# Patient Record
Sex: MALE | Race: WHITE | NOT HISPANIC OR LATINO | Employment: FULL TIME | URBAN - METROPOLITAN AREA
[De-identification: names, ages, dates, MRNs, and addresses within clinical notes are randomized per-mention and may not be internally consistent; named-entity substitution may affect disease eponyms.]

---

## 2023-05-15 ENCOUNTER — OFFICE VISIT (OUTPATIENT)
Dept: PRIMARY CARE | Facility: CLINIC | Age: 58
End: 2023-05-15
Payer: COMMERCIAL

## 2023-05-15 VITALS
BODY MASS INDEX: 36.62 KG/M2 | DIASTOLIC BLOOD PRESSURE: 86 MMHG | TEMPERATURE: 97.9 F | HEART RATE: 78 BPM | HEIGHT: 70 IN | SYSTOLIC BLOOD PRESSURE: 131 MMHG | OXYGEN SATURATION: 95 % | WEIGHT: 255.8 LBS

## 2023-05-15 DIAGNOSIS — J30.2 SEASONAL ALLERGIC RHINITIS, UNSPECIFIED TRIGGER: ICD-10-CM

## 2023-05-15 DIAGNOSIS — R19.4 BOWEL HABIT CHANGES: ICD-10-CM

## 2023-05-15 DIAGNOSIS — Z12.5 ENCOUNTER FOR PROSTATE CANCER SCREENING: ICD-10-CM

## 2023-05-15 DIAGNOSIS — Z00.00 ROUTINE HEALTH MAINTENANCE: ICD-10-CM

## 2023-05-15 DIAGNOSIS — E11.9 TYPE 2 DIABETES MELLITUS WITHOUT RETINOPATHY (MULTI): ICD-10-CM

## 2023-05-15 DIAGNOSIS — M79.604 PAIN IN BOTH LOWER EXTREMITIES: ICD-10-CM

## 2023-05-15 DIAGNOSIS — I10 PRIMARY HYPERTENSION: ICD-10-CM

## 2023-05-15 DIAGNOSIS — M79.605 PAIN IN BOTH LOWER EXTREMITIES: ICD-10-CM

## 2023-05-15 DIAGNOSIS — Z13.89 ENCOUNTER FOR SCREENING FOR OTHER DISORDER: ICD-10-CM

## 2023-05-15 DIAGNOSIS — J32.0 CHRONIC MAXILLARY SINUSITIS: ICD-10-CM

## 2023-05-15 DIAGNOSIS — M72.2 PLANTAR FASCIITIS: Primary | ICD-10-CM

## 2023-05-15 DIAGNOSIS — D35.00 ADRENAL ADENOMA, UNSPECIFIED LATERALITY: ICD-10-CM

## 2023-05-15 PROCEDURE — 4010F ACE/ARB THERAPY RXD/TAKEN: CPT | Performed by: STUDENT IN AN ORGANIZED HEALTH CARE EDUCATION/TRAINING PROGRAM

## 2023-05-15 PROCEDURE — 3079F DIAST BP 80-89 MM HG: CPT | Performed by: STUDENT IN AN ORGANIZED HEALTH CARE EDUCATION/TRAINING PROGRAM

## 2023-05-15 PROCEDURE — 1036F TOBACCO NON-USER: CPT | Performed by: STUDENT IN AN ORGANIZED HEALTH CARE EDUCATION/TRAINING PROGRAM

## 2023-05-15 PROCEDURE — 3075F SYST BP GE 130 - 139MM HG: CPT | Performed by: STUDENT IN AN ORGANIZED HEALTH CARE EDUCATION/TRAINING PROGRAM

## 2023-05-15 PROCEDURE — 99396 PREV VISIT EST AGE 40-64: CPT | Performed by: STUDENT IN AN ORGANIZED HEALTH CARE EDUCATION/TRAINING PROGRAM

## 2023-05-15 PROCEDURE — 3008F BODY MASS INDEX DOCD: CPT | Performed by: STUDENT IN AN ORGANIZED HEALTH CARE EDUCATION/TRAINING PROGRAM

## 2023-05-15 RX ORDER — LEVOCETIRIZINE DIHYDROCHLORIDE 5 MG/1
5 TABLET, FILM COATED ORAL EVERY EVENING
Qty: 90 TABLET | Refills: 1 | Status: SHIPPED | OUTPATIENT
Start: 2023-05-15 | End: 2023-11-11

## 2023-05-15 RX ORDER — CYCLOBENZAPRINE HCL 10 MG
10 TABLET ORAL EVERY 8 HOURS PRN
COMMUNITY
Start: 2017-08-08 | End: 2023-05-15 | Stop reason: SDUPTHER

## 2023-05-15 RX ORDER — LEVOCETIRIZINE DIHYDROCHLORIDE 5 MG/1
5 TABLET, FILM COATED ORAL 2 TIMES DAILY
COMMUNITY
Start: 2018-01-13 | End: 2023-05-15 | Stop reason: SDUPTHER

## 2023-05-15 RX ORDER — METFORMIN HYDROCHLORIDE 500 MG/1
500 TABLET ORAL 2 TIMES DAILY
COMMUNITY
End: 2023-07-07 | Stop reason: SDUPTHER

## 2023-05-15 RX ORDER — ALBUTEROL SULFATE 90 UG/1
2 AEROSOL, METERED RESPIRATORY (INHALATION)
COMMUNITY

## 2023-05-15 RX ORDER — LISINOPRIL 10 MG/1
10 TABLET ORAL DAILY
Qty: 90 TABLET | Refills: 1 | Status: SHIPPED | OUTPATIENT
Start: 2023-05-15 | End: 2023-11-09

## 2023-05-15 RX ORDER — DOXYCYCLINE 100 MG/1
100 CAPSULE ORAL 2 TIMES DAILY
Qty: 14 CAPSULE | Refills: 0 | Status: SHIPPED | OUTPATIENT
Start: 2023-05-15 | End: 2023-05-22

## 2023-05-15 RX ORDER — FLUTICASONE PROPIONATE 50 MCG
1 SPRAY, SUSPENSION (ML) NASAL DAILY
Qty: 16 G | Refills: 2 | Status: SHIPPED | OUTPATIENT
Start: 2023-05-15

## 2023-05-15 RX ORDER — LISINOPRIL 10 MG/1
10 TABLET ORAL DAILY
COMMUNITY
Start: 2023-02-07 | End: 2023-05-15 | Stop reason: SDUPTHER

## 2023-05-15 RX ORDER — CYCLOBENZAPRINE HCL 10 MG
10 TABLET ORAL EVERY 8 HOURS PRN
Qty: 30 TABLET | Refills: 0 | Status: SHIPPED | OUTPATIENT
Start: 2023-05-15 | End: 2023-09-20 | Stop reason: SDUPTHER

## 2023-05-15 RX ORDER — FAMOTIDINE 20 MG/1
20 TABLET, FILM COATED ORAL 2 TIMES DAILY
COMMUNITY

## 2023-05-15 RX ORDER — FLUTICASONE PROPIONATE 50 MCG
1 SPRAY, SUSPENSION (ML) NASAL
COMMUNITY
End: 2023-05-15 | Stop reason: SDUPTHER

## 2023-05-15 ASSESSMENT — ENCOUNTER SYMPTOMS
SHORTNESS OF BREATH: 0
FEVER: 0
CHILLS: 0
DYSURIA: 0
SINUS PRESSURE: 1
VOMITING: 0
PALPITATIONS: 0
EYE PAIN: 1
DIAPHORESIS: 0
NAUSEA: 0

## 2023-05-15 ASSESSMENT — PATIENT HEALTH QUESTIONNAIRE - PHQ9
SUM OF ALL RESPONSES TO PHQ9 QUESTIONS 1 AND 2: 6
1. LITTLE INTEREST OR PLEASURE IN DOING THINGS: NEARLY EVERY DAY
2. FEELING DOWN, DEPRESSED OR HOPELESS: NEARLY EVERY DAY

## 2023-05-15 NOTE — PROGRESS NOTES
"Subjective   Patient ID: Oz Epstein is a 57 y.o. male who presents for Annual Exam.  He is here for CPE.    Weight loss but unchanged from Feb.    Plantar fasciitis: Dx with plantar fasciitis and last saw a few years ago. Feels has worsen despite conservative measures and stretches.     He gets numbness/tingling in hands and feet at times. Constantly moving around at night and having trouble sleeping. Urinating more frequently.    Dr. Weston is his urologist. Seeing for chronic Epididymitis.    He reports penciling in the stools. Sometimes has diarrhea and sometimes has constipation. Has blood in stool and hemorrhoids and improves with time. Noticing more whitish orange specks and mucousy changes in his stool. Does have pain with bowel movements.     Feels his ears get warm and temp of 102 in his ears when temporal temp at home is normal. He still feels lots of fluid in his ears and his sinuses. Feels a clicking in his ears. Less hearing in his left year when he lays down but improves when he sits up. Feels sinusitis hasn't really improved and has bilateral maxillary sinusitis.    Has had some depressed mood as yesterday was Mother's Day which is exceptionally hard given the loss of his daughter and his mother.  Feels he is coping appropriately and has support.    A1c: 6.2 in 2016. Saw eye doctor a year ago.         Review of Systems   Constitutional:  Negative for chills, diaphoresis and fever.   HENT:  Positive for hearing loss and sinus pressure (Maxillary bilaterally).    Eyes:  Positive for pain (bilateral L>R, saw eye doctor for this). Negative for visual disturbance.   Respiratory:  Negative for shortness of breath.    Cardiovascular:  Negative for chest pain and palpitations.   Gastrointestinal:  Negative for nausea and vomiting.   Genitourinary:  Negative for dysuria.   Skin:  Negative for rash.       /86   Pulse 78   Temp 36.6 °C (97.9 °F)   Ht 1.778 m (5' 10\")   Wt 116 kg (255 lb 12.8 oz)   " SpO2 95%   BMI 36.70 kg/m²     Objective   Physical Exam  Vitals reviewed.   Constitutional:       General: He is not in acute distress.     Appearance: Normal appearance.   HENT:      Head: Normocephalic.   Cardiovascular:      Rate and Rhythm: Normal rate and regular rhythm.   Pulmonary:      Effort: Pulmonary effort is normal. No respiratory distress.      Breath sounds: Normal breath sounds.   Abdominal:      General: There is no distension.   Musculoskeletal:         General: No deformity.        Feet:    Feet:      Comments: Areas in yellow with intact monofilament testing.  Skin:     Coloration: Skin is not jaundiced.   Neurological:      Mental Status: He is alert.         Assessment/Plan   Problem List Items Addressed This Visit          Circulatory    Primary hypertension     Controlled today, continue lisinopril 10 mg a day.  Will get EKG at next visit next month.         Relevant Medications    lisinopril 10 mg tablet       Digestive    Adrenal adenoma     Bilateral adrenal nodules on past imaging.  Had previously been seeing endocrinology.  Reordered ultrasound of the adrenal glands and may need to re establish care.  Will discuss next month.         Relevant Orders    US renal complete       Musculoskeletal    Pain in both lower extremities     Flexeril 10 mg every 8 hours only as needed.         Relevant Medications    cyclobenzaprine (Flexeril) 10 mg tablet    Plantar fasciitis - Primary     Given lack improvement with conservative measures referred to podiatry to establish care.         Relevant Orders    Referral to Podiatry       Endocrine/Metabolic    Type 2 diabetes mellitus without retinopathy (CMS/HCC)     Repeat A1c ordered.  Currently on metformin 500 mg twice a day and may need to increase depending on what A1c is. Diabetic foot exam normal today. Ordered urine spot protein. Recommended annual eye exam.          Relevant Orders    Protein, Urine Random    BMI 36.0-36.9,adult        Infectious/Inflammatory    Chronic maxillary sinusitis     Given the persistence of symptoms ordered CT of the sinuses and referred to ENT.  Empirically treating with a course of Doxycycline.  Advised to drink a full glass of water and sit upright for at least an hour after taking to reduce risk of ulcers.         Relevant Medications    doxycycline (Vibramycin) 100 mg capsule    Other Relevant Orders    CT sinus wo IV contrast    Referral to ENT       Other    Encounter for prostate cancer screening     PSA ordered.         Relevant Orders    Prostate Spec.Ag,Screen    Routine health maintenance     Health Maintenance  -Prostate Cancer Screening: Ordered  -Vaccinations: Will recommend Tdap, COVID vaccination and booster, shingles vaccine at next visit.   -Lung Cancer Screening: We will quantify cigarette use but quit in 2010  -AAA Screening: Age 63  -Colonoscopy: Up-to-date  -Screen for HIV/Hep C: Discuss at next visit  -Explore diet further at next visit.         Relevant Orders    Comprehensive Metabolic Panel    Lipid Panel    CBC    TSH with reflex to Free T4 if abnormal    Hemoglobin A1c    Bowel habit changes     Normal colonoscopy in 2022 and to be repeated in 3 years.  Referred to GI given changes with his bowel movements.         Relevant Orders    Referral to Gastroenterology    Seasonal allergic rhinitis     Continue levocetirizine and Flonase.         Relevant Medications    levocetirizine (Xyzal) 5 mg tablet    fluticasone (Flonase) 50 mcg/actuation nasal spray     Other Visit Diagnoses       Encounter for screening for other disorder [Z13.89 (ICD-10-CM)]

## 2023-05-16 NOTE — ASSESSMENT & PLAN NOTE
Health Maintenance  -Prostate Cancer Screening: Ordered  -Vaccinations: Will recommend Tdap, COVID vaccination and booster, shingles vaccine at next visit.   -Lung Cancer Screening: We will quantify cigarette use but quit in 2010  -AAA Screening: Age 63  -Colonoscopy: Up-to-date  -Screen for HIV/Hep C: Discuss at next visit  -Explore diet further at next visit.

## 2023-05-16 NOTE — ASSESSMENT & PLAN NOTE
Bilateral adrenal nodules on past imaging.  Had previously been seeing endocrinology.  Reordered ultrasound of the adrenal glands and may need to re establish care.  Will discuss next month.

## 2023-05-16 NOTE — ASSESSMENT & PLAN NOTE
Normal colonoscopy in 2022 and to be repeated in 3 years.  Referred to GI given changes with his bowel movements.

## 2023-05-16 NOTE — ASSESSMENT & PLAN NOTE
Repeat A1c ordered.  Currently on metformin 500 mg twice a day and may need to increase depending on what A1c is. Diabetic foot exam normal today. Ordered urine spot protein. Recommended annual eye exam.

## 2023-05-16 NOTE — ASSESSMENT & PLAN NOTE
Given the persistence of symptoms ordered CT of the sinuses and referred to ENT.  Empirically treating with a course of Doxycycline.  Advised to drink a full glass of water and sit upright for at least an hour after taking to reduce risk of ulcers.

## 2023-05-19 ENCOUNTER — APPOINTMENT (OUTPATIENT)
Dept: PRIMARY CARE | Facility: CLINIC | Age: 58
End: 2023-05-19
Payer: MEDICARE

## 2023-06-01 DIAGNOSIS — J32.0 CHRONIC MAXILLARY SINUSITIS: Primary | ICD-10-CM

## 2023-06-15 ENCOUNTER — APPOINTMENT (OUTPATIENT)
Dept: PRIMARY CARE | Facility: CLINIC | Age: 58
End: 2023-06-15
Payer: MEDICARE

## 2023-06-20 ENCOUNTER — APPOINTMENT (OUTPATIENT)
Dept: PRIMARY CARE | Facility: CLINIC | Age: 58
End: 2023-06-20
Payer: MEDICARE

## 2023-07-07 DIAGNOSIS — E11.9 TYPE 2 DIABETES MELLITUS WITHOUT RETINOPATHY (MULTI): Primary | ICD-10-CM

## 2023-07-07 RX ORDER — METFORMIN HYDROCHLORIDE 500 MG/1
500 TABLET ORAL 2 TIMES DAILY
Qty: 180 TABLET | Refills: 0 | Status: SHIPPED | OUTPATIENT
Start: 2023-07-07 | End: 2023-11-09

## 2023-07-18 ENCOUNTER — TELEPHONE (OUTPATIENT)
Dept: PRIMARY CARE | Facility: CLINIC | Age: 58
End: 2023-07-18
Payer: MEDICARE

## 2023-07-18 PROBLEM — N62 GYNECOMASTIA: Status: ACTIVE | Noted: 2023-07-18

## 2023-07-18 PROBLEM — E55.9 VITAMIN D DEFICIENCY: Status: ACTIVE | Noted: 2023-07-18

## 2023-07-18 NOTE — TELEPHONE ENCOUNTER
----- Message from Arturo Stark DO sent at 7/18/2023  7:32 AM EDT -----  Please let him know I'd like to see him for follow up within the next month and to get the lab work we discussed.

## 2023-07-31 ENCOUNTER — APPOINTMENT (OUTPATIENT)
Dept: PRIMARY CARE | Facility: CLINIC | Age: 58
End: 2023-07-31
Payer: MEDICARE

## 2023-09-15 ENCOUNTER — TELEPHONE (OUTPATIENT)
Dept: PRIMARY CARE | Facility: CLINIC | Age: 58
End: 2023-09-15
Payer: MEDICARE

## 2023-09-20 DIAGNOSIS — M79.604 PAIN IN BOTH LOWER EXTREMITIES: ICD-10-CM

## 2023-09-20 DIAGNOSIS — M79.605 PAIN IN BOTH LOWER EXTREMITIES: ICD-10-CM

## 2023-09-21 DIAGNOSIS — M79.604 PAIN IN BOTH LOWER EXTREMITIES: ICD-10-CM

## 2023-09-21 DIAGNOSIS — M79.605 PAIN IN BOTH LOWER EXTREMITIES: ICD-10-CM

## 2023-09-21 RX ORDER — CYCLOBENZAPRINE HCL 10 MG
10 TABLET ORAL EVERY 8 HOURS PRN
Qty: 30 TABLET | Refills: 0 | Status: SHIPPED | OUTPATIENT
Start: 2023-09-21 | End: 2023-09-21

## 2023-09-21 RX ORDER — CYCLOBENZAPRINE HCL 10 MG
10 TABLET ORAL EVERY 8 HOURS PRN
Qty: 30 TABLET | Refills: 0 | Status: SHIPPED | OUTPATIENT
Start: 2023-09-21 | End: 2023-10-21

## 2023-10-05 ENCOUNTER — APPOINTMENT (OUTPATIENT)
Dept: PRIMARY CARE | Facility: CLINIC | Age: 58
End: 2023-10-05
Payer: MEDICARE

## 2023-10-10 ENCOUNTER — LAB (OUTPATIENT)
Dept: LAB | Facility: LAB | Age: 58
End: 2023-10-10
Payer: MEDICARE

## 2023-10-10 DIAGNOSIS — Z12.5 ENCOUNTER FOR PROSTATE CANCER SCREENING: ICD-10-CM

## 2023-10-10 DIAGNOSIS — Z00.00 ROUTINE HEALTH MAINTENANCE: ICD-10-CM

## 2023-10-10 LAB
ALBUMIN SERPL BCP-MCNC: 4 G/DL (ref 3.4–5)
ALP SERPL-CCNC: 61 U/L (ref 33–120)
ALT SERPL W P-5'-P-CCNC: 11 U/L (ref 10–52)
ANION GAP SERPL CALC-SCNC: 10 MMOL/L (ref 10–20)
AST SERPL W P-5'-P-CCNC: 11 U/L (ref 9–39)
BILIRUB SERPL-MCNC: 0.4 MG/DL (ref 0–1.2)
BUN SERPL-MCNC: 8 MG/DL (ref 6–23)
CALCIUM SERPL-MCNC: 9 MG/DL (ref 8.6–10.3)
CHLORIDE SERPL-SCNC: 102 MMOL/L (ref 98–107)
CHOLEST SERPL-MCNC: 155 MG/DL (ref 0–199)
CHOLESTEROL/HDL RATIO: 4
CO2 SERPL-SCNC: 31 MMOL/L (ref 21–32)
CREAT SERPL-MCNC: 0.93 MG/DL (ref 0.5–1.3)
ERYTHROCYTE [DISTWIDTH] IN BLOOD BY AUTOMATED COUNT: 14.2 % (ref 11.5–14.5)
EST. AVERAGE GLUCOSE BLD GHB EST-MCNC: 120 MG/DL
GFR SERPL CREATININE-BSD FRML MDRD: >90 ML/MIN/1.73M*2
GLUCOSE SERPL-MCNC: 99 MG/DL (ref 74–99)
HBA1C MFR BLD: 5.8 %
HCT VFR BLD AUTO: 44.2 % (ref 41–52)
HDLC SERPL-MCNC: 38.6 MG/DL
HGB BLD-MCNC: 14.4 G/DL (ref 13.5–17.5)
LDLC SERPL CALC-MCNC: 94 MG/DL (ref 140–190)
MCH RBC QN AUTO: 29.5 PG (ref 26–34)
MCHC RBC AUTO-ENTMCNC: 32.6 G/DL (ref 32–36)
MCV RBC AUTO: 91 FL (ref 80–100)
NON HDL CHOLESTEROL: 116 MG/DL (ref 0–149)
NRBC BLD-RTO: 0 /100 WBCS (ref 0–0)
PLATELET # BLD AUTO: 295 X10*3/UL (ref 150–450)
PMV BLD AUTO: 10.6 FL (ref 7.5–11.5)
POTASSIUM SERPL-SCNC: 4 MMOL/L (ref 3.5–5.3)
PROT SERPL-MCNC: 6.5 G/DL (ref 6.4–8.2)
PSA SERPL-MCNC: 0.91 NG/ML
RBC # BLD AUTO: 4.88 X10*6/UL (ref 4.5–5.9)
SODIUM SERPL-SCNC: 139 MMOL/L (ref 136–145)
TRIGL SERPL-MCNC: 112 MG/DL (ref 0–149)
TSH SERPL-ACNC: 1.7 MIU/L (ref 0.44–3.98)
VLDL: 22 MG/DL (ref 0–40)
WBC # BLD AUTO: 10 X10*3/UL (ref 4.4–11.3)

## 2023-10-10 PROCEDURE — 80053 COMPREHEN METABOLIC PANEL: CPT

## 2023-10-10 PROCEDURE — 85027 COMPLETE CBC AUTOMATED: CPT

## 2023-10-10 PROCEDURE — 36415 COLL VENOUS BLD VENIPUNCTURE: CPT

## 2023-10-10 PROCEDURE — 84443 ASSAY THYROID STIM HORMONE: CPT

## 2023-10-10 PROCEDURE — 83036 HEMOGLOBIN GLYCOSYLATED A1C: CPT

## 2023-10-10 PROCEDURE — 84153 ASSAY OF PSA TOTAL: CPT

## 2023-10-10 PROCEDURE — 80061 LIPID PANEL: CPT

## 2023-10-11 ENCOUNTER — APPOINTMENT (OUTPATIENT)
Dept: PRIMARY CARE | Facility: CLINIC | Age: 58
End: 2023-10-11
Payer: MEDICARE

## 2023-10-17 ENCOUNTER — APPOINTMENT (OUTPATIENT)
Dept: PRIMARY CARE | Facility: CLINIC | Age: 58
End: 2023-10-17
Payer: MEDICARE

## 2023-11-09 DIAGNOSIS — E11.9 TYPE 2 DIABETES MELLITUS WITHOUT RETINOPATHY (MULTI): ICD-10-CM

## 2023-11-09 DIAGNOSIS — I10 PRIMARY HYPERTENSION: ICD-10-CM

## 2023-11-09 RX ORDER — METFORMIN HYDROCHLORIDE 500 MG/1
500 TABLET ORAL 2 TIMES DAILY
Qty: 180 TABLET | Refills: 0 | Status: SHIPPED | OUTPATIENT
Start: 2023-11-09 | End: 2024-02-28 | Stop reason: SDUPTHER

## 2023-11-09 RX ORDER — LISINOPRIL 10 MG/1
10 TABLET ORAL DAILY
Qty: 90 TABLET | Refills: 3 | Status: SHIPPED | OUTPATIENT
Start: 2023-11-09 | End: 2024-11-03

## 2023-11-10 ENCOUNTER — TELEMEDICINE (OUTPATIENT)
Dept: PRIMARY CARE | Facility: CLINIC | Age: 58
End: 2023-11-10
Payer: MEDICARE

## 2023-11-10 DIAGNOSIS — Z87.891 FORMER SMOKER: ICD-10-CM

## 2023-11-10 DIAGNOSIS — N41.0 ACUTE PROSTATITIS: Primary | ICD-10-CM

## 2023-11-10 DIAGNOSIS — Z86.59 HISTORY OF DEPRESSIVE SYMPTOMS: ICD-10-CM

## 2023-11-10 DIAGNOSIS — Z12.2 ENCOUNTER FOR SCREENING FOR LUNG CANCER: ICD-10-CM

## 2023-11-10 PROCEDURE — 99214 OFFICE O/P EST MOD 30 MIN: CPT | Performed by: STUDENT IN AN ORGANIZED HEALTH CARE EDUCATION/TRAINING PROGRAM

## 2023-11-10 RX ORDER — SULFAMETHOXAZOLE AND TRIMETHOPRIM 800; 160 MG/1; MG/1
1 TABLET ORAL 2 TIMES DAILY
Qty: 14 TABLET | Refills: 0 | Status: SHIPPED | OUTPATIENT
Start: 2023-11-10 | End: 2023-11-17

## 2023-11-10 ASSESSMENT — ENCOUNTER SYMPTOMS
DYSURIA: 1
VOMITING: 0
DIFFICULTY URINATING: 1
DIAPHORESIS: 1
HEMATURIA: 0
DIARRHEA: 1
CHILLS: 1
FEVER: 0
DIZZINESS: 0
CONSTIPATION: 1
SHORTNESS OF BREATH: 1
LIGHT-HEADEDNESS: 0
NAUSEA: 0

## 2023-11-10 NOTE — PROGRESS NOTES
Subjective   Patient ID: Oz Epstein is a 58 y.o. male who presents for prostatitis.    We attempted to have a virtual visit via audio and video. We experienced technical difficulties with the video portion of the visit but did complete the virtual visit by telephone.     He is having concerns of prostatitis. Has been having burning in the penis for the last 3 weeks with mild pain. He states that he is now having lower back pain. Afebrile at home. He is having burning in the penis and prostatitis symptoms, smaller caliper stools. Hasn't seen Dr. Weston.     Working 60 hours a week. Feeling anxiety and depression since quite stressful at work.     Saw Dr. Man with GI, will follow up.          Review of Systems   Constitutional:  Positive for chills (intermittent) and diaphoresis (at night). Negative for fever.   Eyes:  Negative for visual disturbance.   Respiratory:  Positive for shortness of breath (mild chronic unchanged for 5-6 years).    Cardiovascular:  Negative for chest pain.   Gastrointestinal:  Positive for constipation (alternates between diarrhea) and diarrhea (alterantes between constipation). Negative for nausea and vomiting.   Genitourinary:  Positive for difficulty urinating and dysuria. Negative for hematuria.   Neurological:  Negative for dizziness and light-headedness.       Objective   There were no vitals taken for this visit.    Physical Exam    Assessment/Plan   Problem List Items Addressed This Visit       History of depressive symptoms    Encounter for screening for lung cancer    Relevant Orders    CT lung screening low dose    Acute prostatitis - Primary    Relevant Medications    sulfamethoxazole-trimethoprim (Bactrim DS) 800-160 mg tablet    Other Relevant Orders    Urinalysis with Reflex Microscopic    Urine Culture    Former smoker    Relevant Orders    CT lung screening low dose   Prostatitis  -Will send 1 week of Bactrim to patient's pharmacy.  Advised monitor for any palpitations  or chest pain, shortness of breath and go to ER if these develop.  Discussed Bactrim versus Cipro and decided on Bactrim after discussing with pharmacy downstairs after visit given QT prolongation with Cipro as well as possible tendinopathy versus hyperkalemia with Bactrim though is on low-dose of lisinopril.  - ER for fever, worsening chills, sweats or reasons other was mentioned above.  -Advised that he needs to call urology to discuss symptoms as well as further management.  - Follow-up with me next week.  - Urinalysis and urine culture ordered and advised to have this done at  lab prior to starting antibiotics    History of tobacco use  - Given long-term tobacco history dissolvable for lung CT, ordered today.    Depressive symptoms  -Patient discussed that he would like to take FMLA and he stated that he would need a recent medically to doing so and he states that he has been having significant trouble with mood and depression.  He will follow-up with me in person next week to discuss this further as well as review his other medical concerns.    Healthcare maintenance  -We will need to do EKG in office at next visit next week as well as review above concerns.  Office will call him to schedule this.

## 2023-11-12 PROBLEM — Z87.891 FORMER SMOKER: Status: ACTIVE | Noted: 2023-11-12

## 2023-11-12 PROBLEM — Z12.2 ENCOUNTER FOR SCREENING FOR LUNG CANCER: Status: ACTIVE | Noted: 2023-11-12

## 2023-11-12 PROBLEM — N41.0 ACUTE PROSTATITIS: Status: ACTIVE | Noted: 2023-11-12

## 2023-11-12 PROBLEM — Z86.59 HISTORY OF DEPRESSIVE SYMPTOMS: Status: ACTIVE | Noted: 2023-11-12

## 2023-11-12 PROBLEM — F17.200 TOBACCO DEPENDENCE: Status: ACTIVE | Noted: 2023-11-12

## 2023-11-20 ENCOUNTER — OFFICE VISIT (OUTPATIENT)
Dept: PRIMARY CARE | Facility: CLINIC | Age: 58
End: 2023-11-20
Payer: MEDICARE

## 2023-11-20 VITALS
DIASTOLIC BLOOD PRESSURE: 70 MMHG | BODY MASS INDEX: 36.36 KG/M2 | OXYGEN SATURATION: 98 % | WEIGHT: 254 LBS | RESPIRATION RATE: 16 BRPM | SYSTOLIC BLOOD PRESSURE: 132 MMHG | HEART RATE: 85 BPM | TEMPERATURE: 98.1 F | HEIGHT: 70 IN

## 2023-11-20 DIAGNOSIS — R94.31 EKG ABNORMALITY: ICD-10-CM

## 2023-11-20 DIAGNOSIS — I10 PRIMARY HYPERTENSION: ICD-10-CM

## 2023-11-20 DIAGNOSIS — Z86.59 HISTORY OF DEPRESSIVE SYMPTOMS: ICD-10-CM

## 2023-11-20 DIAGNOSIS — D35.00 ADRENAL ADENOMA, UNSPECIFIED LATERALITY: ICD-10-CM

## 2023-11-20 DIAGNOSIS — F41.9 ANXIETY: ICD-10-CM

## 2023-11-20 DIAGNOSIS — N41.0 ACUTE PROSTATITIS: Primary | ICD-10-CM

## 2023-11-20 DIAGNOSIS — J30.2 SEASONAL ALLERGIC RHINITIS, UNSPECIFIED TRIGGER: ICD-10-CM

## 2023-11-20 PROCEDURE — 3044F HG A1C LEVEL LT 7.0%: CPT | Performed by: STUDENT IN AN ORGANIZED HEALTH CARE EDUCATION/TRAINING PROGRAM

## 2023-11-20 PROCEDURE — 3078F DIAST BP <80 MM HG: CPT | Performed by: STUDENT IN AN ORGANIZED HEALTH CARE EDUCATION/TRAINING PROGRAM

## 2023-11-20 PROCEDURE — 3008F BODY MASS INDEX DOCD: CPT | Performed by: STUDENT IN AN ORGANIZED HEALTH CARE EDUCATION/TRAINING PROGRAM

## 2023-11-20 PROCEDURE — 3075F SYST BP GE 130 - 139MM HG: CPT | Performed by: STUDENT IN AN ORGANIZED HEALTH CARE EDUCATION/TRAINING PROGRAM

## 2023-11-20 PROCEDURE — 93000 ELECTROCARDIOGRAM COMPLETE: CPT | Performed by: STUDENT IN AN ORGANIZED HEALTH CARE EDUCATION/TRAINING PROGRAM

## 2023-11-20 PROCEDURE — 4010F ACE/ARB THERAPY RXD/TAKEN: CPT | Performed by: STUDENT IN AN ORGANIZED HEALTH CARE EDUCATION/TRAINING PROGRAM

## 2023-11-20 PROCEDURE — 3048F LDL-C <100 MG/DL: CPT | Performed by: STUDENT IN AN ORGANIZED HEALTH CARE EDUCATION/TRAINING PROGRAM

## 2023-11-20 PROCEDURE — 99214 OFFICE O/P EST MOD 30 MIN: CPT | Performed by: STUDENT IN AN ORGANIZED HEALTH CARE EDUCATION/TRAINING PROGRAM

## 2023-11-20 PROCEDURE — 1036F TOBACCO NON-USER: CPT | Performed by: STUDENT IN AN ORGANIZED HEALTH CARE EDUCATION/TRAINING PROGRAM

## 2023-11-20 ASSESSMENT — PATIENT HEALTH QUESTIONNAIRE - PHQ9
7. TROUBLE CONCENTRATING ON THINGS, SUCH AS READING THE NEWSPAPER OR WATCHING TELEVISION: NOT AT ALL
4. FEELING TIRED OR HAVING LITTLE ENERGY: NEARLY EVERY DAY
8. MOVING OR SPEAKING SO SLOWLY THAT OTHER PEOPLE COULD HAVE NOTICED. OR THE OPPOSITE, BEING SO FIGETY OR RESTLESS THAT YOU HAVE BEEN MOVING AROUND A LOT MORE THAN USUAL: SEVERAL DAYS
6. FEELING BAD ABOUT YOURSELF - OR THAT YOU ARE A FAILURE OR HAVE LET YOURSELF OR YOUR FAMILY DOWN: NOT AT ALL
5. POOR APPETITE OR OVEREATING: MORE THAN HALF THE DAYS
2. FEELING DOWN, DEPRESSED OR HOPELESS: SEVERAL DAYS
9. THOUGHTS THAT YOU WOULD BE BETTER OFF DEAD, OR OF HURTING YOURSELF: NOT AT ALL
3. TROUBLE FALLING OR STAYING ASLEEP: NEARLY EVERY DAY
1. LITTLE INTEREST OR PLEASURE IN DOING THINGS: SEVERAL DAYS
SUM OF ALL RESPONSES TO PHQ9 QUESTIONS 1 & 2: 2
SUM OF ALL RESPONSES TO PHQ QUESTIONS 1-9: 11

## 2023-11-20 ASSESSMENT — ENCOUNTER SYMPTOMS
FLANK PAIN: 0
COUGH: 0
DYSURIA: 0
VOMITING: 0
DIAPHORESIS: 1
DIARRHEA: 0
LIGHT-HEADEDNESS: 0
UNEXPECTED WEIGHT CHANGE: 0
CHILLS: 1
DIFFICULTY URINATING: 1
NAUSEA: 0
FEVER: 0
SHORTNESS OF BREATH: 1
HEMATURIA: 0

## 2023-11-20 ASSESSMENT — ANXIETY QUESTIONNAIRES
1. FEELING NERVOUS, ANXIOUS, OR ON EDGE: SEVERAL DAYS
3. WORRYING TOO MUCH ABOUT DIFFERENT THINGS: NEARLY EVERY DAY
5. BEING SO RESTLESS THAT IT IS HARD TO SIT STILL: SEVERAL DAYS
7. FEELING AFRAID AS IF SOMETHING AWFUL MIGHT HAPPEN: NOT AT ALL
6. BECOMING EASILY ANNOYED OR IRRITABLE: MORE THAN HALF THE DAYS
GAD7 TOTAL SCORE: 8
4. TROUBLE RELAXING: SEVERAL DAYS
2. NOT BEING ABLE TO STOP OR CONTROL WORRYING: NOT AT ALL

## 2023-11-20 NOTE — PROGRESS NOTES
"Subjective   Patient ID: Oz Epstein is a 58 y.o. male who presents for Follow-up.    Pt is here today for a routine follow up.     Pt is also following up on prostatitis. Pt states that he is still having sx. He did finish a script of bactrim, having minimal symptoms. Seeing Dr. Weston and Dr. Man next week.     Exhausted for a few weeks with fatigue with recent UTI/prostatitis symptoms.     Able to walk 2 flights of stairs without SOB or chest pain.     Reports RLQ pain has been present most of his life and is unchanged.             Review of Systems   Constitutional:  Positive for chills (intermittent and rare) and diaphoresis (rare nights weats). Negative for fever and unexpected weight change.   HENT:  Negative for hearing loss.    Eyes:  Positive for visual disturbance (glasses aren't as effective on right-sees eye doctor).   Respiratory:  Positive for shortness of breath. Negative for cough.    Cardiovascular:  Negative for chest pain.   Gastrointestinal:  Negative for diarrhea, nausea and vomiting.   Genitourinary:  Positive for difficulty urinating (Stream not strong as used to be) and urgency. Negative for dysuria, flank pain, hematuria and penile discharge.        Suprapubic discomfort, intermittent and not related to urination.     Foul smelling urine resolved   Skin:  Negative for rash.   Neurological:  Negative for light-headedness.       Objective   /70   Pulse 85   Temp 36.7 °C (98.1 °F) (Tympanic)   Resp 16   Ht 1.778 m (5' 10\")   Wt 115 kg (254 lb)   SpO2 98%   BMI 36.45 kg/m²     Physical Exam  Vitals reviewed.   Constitutional:       General: He is not in acute distress.     Appearance: Normal appearance.   HENT:      Head: Normocephalic.   Cardiovascular:      Rate and Rhythm: Normal rate and regular rhythm.   Pulmonary:      Effort: Pulmonary effort is normal. No respiratory distress.      Breath sounds: Normal breath sounds.   Abdominal:      General: There is no distension. "      Palpations: There is no mass.      Tenderness: There is abdominal tenderness (mild suprapubic and rlq tenderness). There is no right CVA tenderness, left CVA tenderness, guarding or rebound.   Musculoskeletal:         General: No deformity.   Skin:     Coloration: Skin is not jaundiced.   Neurological:      Mental Status: He is alert.         Assessment/Plan   Problem List Items Addressed This Visit       Adrenal adenoma    Primary hypertension    Relevant Orders    ECG 12 lead (Clinic Performed) (Completed)    Seasonal allergic rhinitis    History of depressive symptoms    Acute prostatitis - Primary    Relevant Orders    Urinalysis with Reflex Microscopic    Urine Culture    EKG abnormality    Relevant Orders    Transthoracic Echo (TTE) Complete    Anxiety   Possible Prostatitis  -He will get UA and urine culture today. He did not get previous urinalysis and urine culture.   -Bactrim pending results of UA and urine culture  - ER for fever, worsening chills, sweats or reasons other was mentioned above.  -Advised that he needs to call urology to discuss symptoms as well as further management as is out of my scope for long term management of this.     History of tobacco use  - Given long-term tobacco history he is eligible for lung CT, he will schedule     Depression  Anxiety  -PHQ9 is 11, GAD7 is 8.  -He is quite overwhelmed with stress of work, financial situation after death of family members, and the stress of current medical issues. It is affecting him at night as well. I think it is reasonable given all of this to take a week off of work and reset.   -Not interested in medication right now but will reassess in 2-4 weeks. Could be related to acute stress.   EKG abnormality  -Seen today on ekg  -?Anterior infarct, echo ordered    Seasonal allergic rhinitis  -Continue levocetirizine and Flonase.     Type 2 diabetes mellitus  -Repeat A1c 3 months.  Currently on metformin 500 mg twice a day and may need to  increase depending on what A1c is. Diabetic foot exam normal at prior visit. Ordered urine spot protein. Recommended annual eye exam.     Plantar fasciitis  -Given lack improvement with conservative measures referred to podiatry to establish care.  -Will see today    Adrenal adenoma  -Was seeing endocrinology  -Renal u/s showed no adenomas  -Can refer to endo in future vs ct adrenal    Primary hypertension  -On lisinopril 10mg/day  -Controlled relatively well today, readdress after urinary tract issues    Health Maintenance  -Prostate Cancer Screening: Normal 10/23  -Vaccinations: Recommend Tdap, COVID vaccination and booster, shingles vaccine.  -Lung Cancer Screening: Ordered  -AAA Screening: Age 65  -Colonoscopy: Up-to-date-done 6/22 at [facility], due 6/25  -Screen for HIV/Hep C: Discuss at next visit    F/u 2-4 weeks

## 2023-11-20 NOTE — LETTER
November 20, 2023     Patient: Oz Epstein   YOB: 1965   Date of Visit: 11/20/2023       To Whom It May Concern:    Oz Epstein was seen in my clinic on 11/20/2023 at 8:00 am. Please excuse Oz for his absence from work and he is to return 11/27/2023.    If you have any questions or concerns, please don't hesitate to call.         Sincerely,         Arturo Stark,         CC: No Recipients

## 2024-01-08 ENCOUNTER — HOSPITAL ENCOUNTER (OUTPATIENT)
Dept: CARDIOLOGY | Facility: CLINIC | Age: 59
Discharge: HOME | End: 2024-01-08
Payer: MEDICARE

## 2024-01-08 DIAGNOSIS — R94.31 EKG ABNORMALITY: ICD-10-CM

## 2024-01-08 PROCEDURE — 93306 TTE W/DOPPLER COMPLETE: CPT

## 2024-01-08 PROCEDURE — 93306 TTE W/DOPPLER COMPLETE: CPT | Performed by: STUDENT IN AN ORGANIZED HEALTH CARE EDUCATION/TRAINING PROGRAM

## 2024-01-09 LAB
AORTIC VALVE PEAK VELOCITY: 1.34
AV PEAK GRADIENT: 7.1
AVA (PEAK VEL): 4.48
EJECTION FRACTION APICAL 4 CHAMBER: 63.5
EJECTION FRACTION: 61
LEFT ATRIUM VOLUME AREA LENGTH INDEX BSA: 36.2
LEFT VENTRICLE INTERNAL DIMENSION DIASTOLE: 4.78 (ref 3.5–6)
LEFT VENTRICULAR OUTFLOW TRACT DIAMETER: 2.49
MITRAL VALVE E/A RATIO: 0.75
MITRAL VALVE E/E' RATIO: 13.83
RIGHT VENTRICLE FREE WALL PEAK S': 12
TRICUSPID ANNULAR PLANE SYSTOLIC EXCURSION: 2.1

## 2024-02-28 DIAGNOSIS — E11.9 TYPE 2 DIABETES MELLITUS WITHOUT RETINOPATHY (MULTI): ICD-10-CM

## 2024-02-28 RX ORDER — METFORMIN HYDROCHLORIDE 500 MG/1
500 TABLET ORAL 2 TIMES DAILY
Qty: 180 TABLET | Refills: 0 | Status: SHIPPED | OUTPATIENT
Start: 2024-02-28

## 2024-03-01 ENCOUNTER — TELEMEDICINE (OUTPATIENT)
Dept: PRIMARY CARE | Facility: CLINIC | Age: 59
End: 2024-03-01
Payer: MEDICARE

## 2024-03-01 DIAGNOSIS — J20.9 ACUTE BRONCHITIS, UNSPECIFIED ORGANISM: Primary | ICD-10-CM

## 2024-03-01 PROCEDURE — 1036F TOBACCO NON-USER: CPT | Performed by: STUDENT IN AN ORGANIZED HEALTH CARE EDUCATION/TRAINING PROGRAM

## 2024-03-01 PROCEDURE — 99443 PR PHYS/QHP TELEPHONE EVALUATION 21-30 MIN: CPT | Performed by: STUDENT IN AN ORGANIZED HEALTH CARE EDUCATION/TRAINING PROGRAM

## 2024-03-01 PROCEDURE — 3008F BODY MASS INDEX DOCD: CPT | Performed by: STUDENT IN AN ORGANIZED HEALTH CARE EDUCATION/TRAINING PROGRAM

## 2024-03-01 PROCEDURE — 4010F ACE/ARB THERAPY RXD/TAKEN: CPT | Performed by: STUDENT IN AN ORGANIZED HEALTH CARE EDUCATION/TRAINING PROGRAM

## 2024-03-01 RX ORDER — DOXYCYCLINE 100 MG/1
100 CAPSULE ORAL 2 TIMES DAILY
Qty: 14 CAPSULE | Refills: 0 | Status: SHIPPED | OUTPATIENT
Start: 2024-03-01 | End: 2024-03-08

## 2024-03-01 RX ORDER — PREDNISONE 20 MG/1
40 TABLET ORAL DAILY
Qty: 10 TABLET | Refills: 0 | Status: SHIPPED | OUTPATIENT
Start: 2024-03-01 | End: 2024-03-06

## 2024-03-01 ASSESSMENT — ENCOUNTER SYMPTOMS
SHORTNESS OF BREATH: 1
COUGH: 1
DIZZINESS: 0
VOMITING: 0
SINUS PRESSURE: 1
LIGHT-HEADEDNESS: 0
RHINORRHEA: 1
SINUS PAIN: 1
CHILLS: 0
NAUSEA: 0
DIARRHEA: 0
FEVER: 0
DIAPHORESIS: 0

## 2024-03-01 NOTE — PROGRESS NOTES
Subjective   Patient ID: Oz Epstein is a 58 y.o. male who presents for URI.  URI   Associated symptoms include congestion (head and chest congestion), coughing (yellow and greenish mucus), rhinorrhea and sinus pain. Pertinent negatives include no chest pain, diarrhea, ear pain, nausea or vomiting.       Having URI symptoms for last few days. Was cleaning out old attic a few days prior. Did home covid test and was negative. Albuterol seems to helps his symptoms.     Review of Systems   Constitutional:  Negative for chills, diaphoresis and fever.   HENT:  Positive for congestion (head and chest congestion), postnasal drip, rhinorrhea, sinus pressure and sinus pain. Negative for ear discharge, ear pain and hearing loss.    Eyes:  Negative for visual disturbance.   Respiratory:  Positive for cough (yellow and greenish mucus) and shortness of breath (mild intermittent a/w worse coughing).    Cardiovascular:  Negative for chest pain.   Gastrointestinal:  Negative for diarrhea, nausea and vomiting.   Neurological:  Negative for dizziness, syncope and light-headedness.       There were no vitals taken for this visit.    Objective   Physical Exam    Assessment/Plan   Problem List Items Addressed This Visit    None  Visit Diagnoses       Acute bronchitis, unspecified organism    -  Primary    Relevant Medications    doxycycline (Vibramycin) 100 mg capsule    predniSONE (Deltasone) 20 mg tablet            Acute bronchitis  - Plenty of fluids and rest  - Discussed could be viral also could be bacterial.  Given his smoking history we will treat with steroids and antibiotics.  - Doxycycline for 7 days, discussed drink with full glass of water and sit upright while taking to prevent esophagitis/esophageal ulceration and perforation  - Prednisone burst sent to pharmacy  - Discussed ER for worsening symptoms including shortness of breath, chest pain, fevers, chills, sweats, decreased pulse ox less than 90%, using albuterol  around-the-clock  - Follow-up with me in 4 to 6 weeks, sooner if needed

## 2024-08-28 ENCOUNTER — OFFICE VISIT (OUTPATIENT)
Dept: PRIMARY CARE | Facility: CLINIC | Age: 59
End: 2024-08-28
Payer: MEDICARE

## 2024-08-28 VITALS
SYSTOLIC BLOOD PRESSURE: 146 MMHG | HEIGHT: 70 IN | WEIGHT: 245 LBS | DIASTOLIC BLOOD PRESSURE: 80 MMHG | OXYGEN SATURATION: 98 % | BODY MASS INDEX: 35.07 KG/M2 | HEART RATE: 82 BPM | TEMPERATURE: 97.9 F

## 2024-08-28 DIAGNOSIS — R39.9 UTI SYMPTOMS: ICD-10-CM

## 2024-08-28 DIAGNOSIS — J06.9 UPPER RESPIRATORY TRACT INFECTION, UNSPECIFIED TYPE: Primary | ICD-10-CM

## 2024-08-28 LAB
POC APPEARANCE, URINE: CLEAR
POC BILIRUBIN, URINE: NEGATIVE
POC BLOOD, URINE: ABNORMAL
POC COLOR, URINE: YELLOW
POC GLUCOSE, URINE: NEGATIVE MG/DL
POC KETONES, URINE: NEGATIVE MG/DL
POC LEUKOCYTES, URINE: NEGATIVE
POC NITRITE,URINE: NEGATIVE
POC PH, URINE: 6 PH
POC PROTEIN, URINE: NEGATIVE MG/DL
POC SARS-COV-2 AG BINAX: ABNORMAL
POC SPECIFIC GRAVITY, URINE: 1.02
POC UROBILINOGEN, URINE: 0.2 EU/DL

## 2024-08-28 PROCEDURE — 87811 SARS-COV-2 COVID19 W/OPTIC: CPT | Performed by: NURSE PRACTITIONER

## 2024-08-28 PROCEDURE — 3079F DIAST BP 80-89 MM HG: CPT | Performed by: NURSE PRACTITIONER

## 2024-08-28 PROCEDURE — 81002 URINALYSIS NONAUTO W/O SCOPE: CPT | Performed by: NURSE PRACTITIONER

## 2024-08-28 PROCEDURE — 3008F BODY MASS INDEX DOCD: CPT | Performed by: NURSE PRACTITIONER

## 2024-08-28 PROCEDURE — 4010F ACE/ARB THERAPY RXD/TAKEN: CPT | Performed by: NURSE PRACTITIONER

## 2024-08-28 PROCEDURE — 3077F SYST BP >= 140 MM HG: CPT | Performed by: NURSE PRACTITIONER

## 2024-08-28 PROCEDURE — 99213 OFFICE O/P EST LOW 20 MIN: CPT | Performed by: NURSE PRACTITIONER

## 2024-08-28 ASSESSMENT — PATIENT HEALTH QUESTIONNAIRE - PHQ9
SUM OF ALL RESPONSES TO PHQ9 QUESTIONS 1 AND 2: 0
2. FEELING DOWN, DEPRESSED OR HOPELESS: NOT AT ALL
1. LITTLE INTEREST OR PLEASURE IN DOING THINGS: NOT AT ALL

## 2024-08-28 ASSESSMENT — ENCOUNTER SYMPTOMS
COUGH: 1
RHINORRHEA: 1

## 2024-08-28 NOTE — PROGRESS NOTES
"Subjective   Patient ID: Oz Epstein is a 58 y.o. male who presents for Back Pain (Lower back pain, onset about 1 week ago ) and URI (Onset last night, nasal and chest congestion. ).    URI symptoms developed last night. Started with acute low back pain. No rqadiation of pain into legs. No groin or flank pain.      URI   This is a new problem. The current episode started yesterday. There has been no fever. Associated symptoms include coughing and rhinorrhea.        Review of Systems   HENT:  Positive for rhinorrhea.    Respiratory:  Positive for cough.      otherwise negative aside from what was mentioned above in HPI.    Objective   /80 (BP Location: Left arm, Patient Position: Sitting, BP Cuff Size: Adult)   Pulse 82   Temp 36.6 °C (97.9 °F)   Ht 1.778 m (5' 10\")   Wt 111 kg (245 lb)   SpO2 98%   BMI 35.15 kg/m²     Physical Exam  Constitutional:       Appearance: Normal appearance.   HENT:      Right Ear: Tympanic membrane normal.      Left Ear: Tympanic membrane normal.   Eyes:      Conjunctiva/sclera: Conjunctivae normal.   Cardiovascular:      Rate and Rhythm: Normal rate and regular rhythm.   Pulmonary:      Effort: Pulmonary effort is normal.      Breath sounds: Normal breath sounds.   Abdominal:      Palpations: Abdomen is soft.   Neurological:      Mental Status: He is alert.   Psychiatric:         Mood and Affect: Mood normal.         Thought Content: Thought content normal.         Assessment/Plan   Problem List Items Addressed This Visit    None  Visit Diagnoses         Codes    Upper respiratory tract infection, unspecified type    -  Primary J06.9    Relevant Medications    nirmatrelvir-ritonavir (PAXLOVID) 300 mg (150 mg x 2)-100 mg tablet therapy pack    Other Relevant Orders    POCT BinaxNOW Covid-19 Ag Card manually resulted (Completed)    UTI symptoms     R39.9    Relevant Orders    POCT UA (nonautomated) manually resulted (Completed)    POCT BinaxNOW Covid-19 Ag Card manually " resulted (Completed)        Plan:  -Drink plenty of fluids and get plenty of rest. If you develop SOB, chest pain, dizziness, or severe headache call for follow up or go directly to ER.     -Monitor your health and practice social distancing. Social distancing been staying out of crowded places, avoiding group gatherings, and maintaining distance (approximately 6 feet) from others when possible until symptoms are getting better and you have not had a fever for more than 24 hours without fever reducing medications.     -If you get sick with fever (100.4°F/38°C or higher), cough, or have trouble breathing; call our office back or call the emergency room. Discussed the importance of calling ahead to prevent the spread of infection to others.    -Avoid contact with others.    -Do not travel while sick.    -Wash her hands often with soap and water for at least 20 seconds. If soap and water are not readily available, and use an alcohol-based hand  that contains at least 60% alcohol. Soap and water should be used if hands are visibly dirty.

## 2024-11-15 DIAGNOSIS — E11.9 TYPE 2 DIABETES MELLITUS WITHOUT RETINOPATHY (MULTI): ICD-10-CM

## 2024-11-15 DIAGNOSIS — I10 PRIMARY HYPERTENSION: ICD-10-CM

## 2024-11-15 RX ORDER — METFORMIN HYDROCHLORIDE 500 MG/1
500 TABLET ORAL 2 TIMES DAILY
Qty: 180 TABLET | Refills: 0 | Status: SHIPPED | OUTPATIENT
Start: 2024-11-15

## 2024-11-15 RX ORDER — LISINOPRIL 10 MG/1
10 TABLET ORAL DAILY
Qty: 90 TABLET | Refills: 0 | Status: SHIPPED | OUTPATIENT
Start: 2024-11-15